# Patient Record
Sex: MALE | Race: WHITE | Employment: FULL TIME | URBAN - METROPOLITAN AREA
[De-identification: names, ages, dates, MRNs, and addresses within clinical notes are randomized per-mention and may not be internally consistent; named-entity substitution may affect disease eponyms.]

---

## 2020-10-09 ENCOUNTER — HOSPITAL ENCOUNTER (OUTPATIENT)
Dept: PHYSICAL THERAPY | Age: 35
Setting detail: THERAPIES SERIES
Discharge: HOME | End: 2020-10-09
Payer: COMMERCIAL

## 2020-10-09 PROCEDURE — 97110 THERAPEUTIC EXERCISES: CPT | Performed by: PHYSICAL THERAPIST

## 2020-10-09 PROCEDURE — 97161 PT EVAL LOW COMPLEX 20 MIN: CPT | Performed by: PHYSICAL THERAPIST

## 2020-10-09 PROCEDURE — G0283 ELEC STIM OTHER THAN WOUND: HCPCS | Performed by: PHYSICAL THERAPIST

## 2020-10-09 ASSESSMENT — PAIN SCALES - GENERAL: PAINLEVEL_OUTOF10: 5

## 2020-10-09 ASSESSMENT — PAIN DESCRIPTION - PROGRESSION: CLINICAL_PROGRESSION: NOT CHANGED

## 2020-10-09 ASSESSMENT — PAIN DESCRIPTION - FREQUENCY: FREQUENCY: CONTINUOUS

## 2020-10-09 ASSESSMENT — PAIN DESCRIPTION - LOCATION: LOCATION: BACK

## 2020-10-09 ASSESSMENT — PAIN - FUNCTIONAL ASSESSMENT: PAIN_FUNCTIONAL_ASSESSMENT: PREVENTS OR INTERFERES WITH MANY ACTIVE NOT PASSIVE ACTIVITIES

## 2020-10-09 ASSESSMENT — PAIN DESCRIPTION - DESCRIPTORS: DESCRIPTORS: SHOOTING;SHARP

## 2020-10-09 ASSESSMENT — PAIN DESCRIPTION - PAIN TYPE: TYPE: ACUTE PAIN

## 2020-10-09 ASSESSMENT — PAIN DESCRIPTION - ORIENTATION: ORIENTATION: RIGHT

## 2020-10-16 ENCOUNTER — HOSPITAL ENCOUNTER (OUTPATIENT)
Dept: PHYSICAL THERAPY | Age: 35
Setting detail: THERAPIES SERIES
Discharge: HOME | End: 2020-10-16
Payer: COMMERCIAL

## 2020-10-16 PROCEDURE — G0283 ELEC STIM OTHER THAN WOUND: HCPCS | Performed by: PHYSICAL THERAPIST

## 2020-10-16 PROCEDURE — 97110 THERAPEUTIC EXERCISES: CPT | Performed by: PHYSICAL THERAPIST

## 2020-10-16 ASSESSMENT — PAIN DESCRIPTION - FREQUENCY: FREQUENCY: CONTINUOUS

## 2020-10-16 ASSESSMENT — PAIN SCALES - GENERAL: PAINLEVEL_OUTOF10: 3

## 2020-10-16 ASSESSMENT — PAIN DESCRIPTION - PAIN TYPE: TYPE: ACUTE PAIN

## 2020-10-16 ASSESSMENT — PAIN - FUNCTIONAL ASSESSMENT: PAIN_FUNCTIONAL_ASSESSMENT: PREVENTS OR INTERFERES SOME ACTIVE ACTIVITIES AND ADLS

## 2020-10-16 ASSESSMENT — PAIN DESCRIPTION - LOCATION: LOCATION: BACK

## 2020-10-16 ASSESSMENT — PAIN DESCRIPTION - DESCRIPTORS: DESCRIPTORS: ACHING

## 2020-10-16 ASSESSMENT — PAIN DESCRIPTION - PROGRESSION: CLINICAL_PROGRESSION: GRADUALLY IMPROVING

## 2020-10-16 NOTE — PROGRESS NOTES
[address]   Treatment Note                                          Date: 10/16/2020  Patient: Precious Sorto  : 1985  ACCT #: [de-identified]  Referring Practitioner: Humera Read  Diagnosis: Sprain/strain low back    Visit Information:  PT Visit Information  Onset Date: 20  PT Insurance Information: [id number] [id number]  Plan of Care/Certification Expiration Date: 20  Progress Note Counter:     Subjective: Patient states he feeling better today, used a hot tub. HEP Compliance:  [x] Good [] Fair [] Poor [] Reports not doing due to:    Vital Signs  Patient Currently in Pain: Yes   Pain Screening  Patient Currently in Pain: Yes  Pain Assessment  Pain Assessment: 0-10  Pain Level: 3  Patient's Stated Pain Goal: No pain  Pain Type: Acute pain  Pain Location: Back  Pain Radiating Towards: Center and low back area  Pain Descriptors: Aching  Pain Frequency: Continuous  Clinical Progression: Gradually improving  Functional Pain Assessment: Prevents or interferes some active activities and ADLs    OBJECTIVE:   Exercises  Exercise 1: Single knee to chest  Exercise 2: supine knee rolls  Exercise 3: standing trunk sidebending, extension and rotation  Exercise 4: Supine bent knee fall out           Modalities:  Modalities  Moist heat: Moist heat in conjunction with IES  E-stim (parameters): Molly cross pattern across low back area     *Indicates exercise, modality, or manual techniques to be initiated when appropriate    Assessment:   Activity Tolerance  Activity Tolerance: Patient limited by pain  Activity Tolerance: Patient able to tolerate supine position today. Brought in a lumbar support with a sacral pad which fits him well. He reports that it seems to support his back well    Body structures, Functions, Activity limitations: Decreased ROM, Decreased strength, Increased pain  Assessment: Patient able to move his trunk better today, with less pain.   He still reports \"popping\" of his SI area when he flexes, and rotates his hip  Treatment Diagnosis: Sprain and strain of the lumbar area  Prognosis: Fair  PT Education: Home Exercise Program  Patient Education: May wear the lumbar support, but only for a few hours per day, and not when sleeping    Goals:  Short term goals  Time Frame for Short term goals: 3-5 treatments  Short term goal 1: Increase trunk range of motion by 10 degrees all affected motions  Short term goal 2: Pain decreased to 4/10 of the lower back  Short term goal 3: 50% reduction of tenderness of the right SI and sacral area       Progress toward goals:    POST-PAIN       Pain Rating (0-10 pain scale): 3  /10   Location and pain description same as pre-treatment unless indicated. Action: [] NA   [] Perform HEP  [] Meds as prescribed  [] Modalities as prescribed   [] Call Physician     Frequency/Duration:  Plan  Times per week: 2  Plan weeks: 6  Current Treatment Recommendations: Strengthening, ROM, Modalities, Pain Management     Pt to continue current HEP. See objective section for any therapeutic exercise changes, additions or modifications this date.     Treatment Initiated : IES and moist heat to low back and exercise    PT Individual Minutes  Time In: 1330  Time Out: 1410  Minutes: 40  Timed Code Treatment Minutes: 40 Minutes  Procedure Minutes:     code In/out Timed Activity Minutes Units   [id number] [id number] Ther Ex 25 2   [id number] [id number] IES 15 1       Signature:  Electronically signed by Rere Coates PT on 10/16/20 at 2:21 PM EDT

## 2020-10-21 ENCOUNTER — HOSPITAL ENCOUNTER (OUTPATIENT)
Dept: PHYSICAL THERAPY | Age: 35
Setting detail: THERAPIES SERIES
Discharge: HOME | End: 2020-10-21
Payer: COMMERCIAL

## 2020-10-21 PROCEDURE — 97110 THERAPEUTIC EXERCISES: CPT | Performed by: PHYSICAL THERAPIST

## 2020-10-21 PROCEDURE — G0283 ELEC STIM OTHER THAN WOUND: HCPCS | Performed by: PHYSICAL THERAPIST

## 2020-10-21 ASSESSMENT — PAIN DESCRIPTION - ORIENTATION: ORIENTATION: RIGHT

## 2020-10-21 ASSESSMENT — PAIN - FUNCTIONAL ASSESSMENT: PAIN_FUNCTIONAL_ASSESSMENT: PREVENTS OR INTERFERES SOME ACTIVE ACTIVITIES AND ADLS

## 2020-10-21 ASSESSMENT — PAIN DESCRIPTION - ONSET: ONSET: ON-GOING

## 2020-10-21 ASSESSMENT — PAIN SCALES - GENERAL: PAINLEVEL_OUTOF10: 4

## 2020-10-21 ASSESSMENT — PAIN DESCRIPTION - PAIN TYPE: TYPE: ACUTE PAIN

## 2020-10-21 ASSESSMENT — PAIN DESCRIPTION - LOCATION: LOCATION: BACK

## 2020-10-21 ASSESSMENT — PAIN DESCRIPTION - DESCRIPTORS: DESCRIPTORS: ACHING

## 2020-10-21 ASSESSMENT — PAIN DESCRIPTION - FREQUENCY: FREQUENCY: CONTINUOUS

## 2020-10-21 NOTE — PROGRESS NOTES
[id number] [facility]  Treatment Note                                          Date: 10/21/2020  Patient: Naveen Oviedo  : 1985  ACCT #: [de-identified]  Referring Practitioner: Andrew Hammer  Diagnosis: Sprain/strain low back    Visit Information:  PT Visit Information  Onset Date: 20  PT Insurance Information: [address] [place] [id number]  Total # of Visits Approved: 12  Plan of Care/Certification Expiration Date: 20  Progress Note Counter: 3/12    Subjective: Patient states he is better but still has low back pain     HEP Compliance:  [x] Good [] Fair [] Poor [] Reports not doing due to:    Vital Signs  Patient Currently in Pain: Yes   Pain Screening  Patient Currently in Pain: Yes  Pain Assessment  Pain Assessment: 0-10  Pain Level: 4  Patient's Stated Pain Goal: No pain  Pain Type: Acute pain  Pain Location: Back  Pain Orientation: Right  Pain Descriptors: Aching  Pain Frequency: Continuous  Pain Onset: On-going  Functional Pain Assessment: Prevents or interferes some active activities and ADLs    OBJECTIVE:   Exercises  Exercise 1: Single knee to chest  Exercise 2: supine knee rolls  Exercise 3: standing trunk sidebending, extension and rotation  Exercise 4: Supine bent knee fall out with yellow band  Exercise 5: Modified piriformis stretch 3 each  Exercise 6: DLS 2# bilateral arms 10 reps  Exercise 7: DLS march in place 1# 10 reps  Exercise 8: Nuwave bike low level 5 minutes            Modalities:  Modalities  Moist heat: Moist heat in conjunction with IES  E-stim (parameters): Molly cross pattern across low back area     *Indicates exercise, modality, or manual techniques to be initiated when appropriate    Assessment:   Activity Tolerance  Activity Tolerance: Patient Tolerated treatment well  Activity Tolerance: Patient able to tolerate the level 1 DLS exercises to increase core strength    Body structures, Functions, Activity limitations: Decreased ROM, Decreased strength, Increased pain  Assessment: Patient is moving better. He was able to tolerate the dynamic lumbar stabilization exercises fairly well. Complaining more of sacral pain today  Treatment Diagnosis: Sprain and strain of the lumbar area  Prognosis: Fair  PT Education: Home Exercise Program    Goals:  Short term goals  Time Frame for Short term goals: 3-5 treatments  Short term goal 1: Increase trunk range of motion by 10 degrees all affected motions  Short term goal 2: Pain decreased to 4/10 of the lower back  Short term goal 3: 50% reduction of tenderness of the right SI and sacral area    Long term goals  Time Frame for Long term goals : 6-12  Long term goal 1: Active range of moiton of the trunk within normal limits  Long term goal 2: No further tenderness of the SI area  Long term goal 3: Pain decreased to 1/10 to 0/10 of the low back  Long term goal 4: Able to return to regular work duty  Progress toward goals:    POST-PAIN       Pain Rating (0-10 pain scale):  3 /10   Location and pain description same as pre-treatment unless indicated. Action: [] NA   [x] Perform HEP  [] Meds as prescribed  [x] Modalities as prescribed   [] Call Physician     Frequency/Duration:  Plan  Times per week: 2  Plan weeks: 6  Current Treatment Recommendations: Strengthening, ROM, Modalities, Pain Management     Pt to continue current HEP. See objective section for any therapeutic exercise changes, additions or modifications this date.          PT Individual Minutes  Time In: 1340  Time Out: 4903  Minutes: 40  Timed Code Treatment Minutes: 40 Minutes  Procedure Minutes:     Timed Activity Minutes code In/out Units   Ther Ex 25 [id number] 2   IES  15 23477 [id number] 1       Signature:  Electronically signed by Daralyn Canavan, PT on 10/21/20 at 5:25 PM EDT

## 2020-10-23 ENCOUNTER — HOSPITAL ENCOUNTER (OUTPATIENT)
Dept: PHYSICAL THERAPY | Age: 35
Setting detail: THERAPIES SERIES
Discharge: HOME | End: 2020-10-23
Payer: COMMERCIAL

## 2020-10-23 PROCEDURE — 97110 THERAPEUTIC EXERCISES: CPT | Performed by: PHYSICAL THERAPIST

## 2020-10-23 PROCEDURE — G0283 ELEC STIM OTHER THAN WOUND: HCPCS | Performed by: PHYSICAL THERAPIST

## 2020-10-23 ASSESSMENT — PAIN DESCRIPTION - ONSET: ONSET: ON-GOING

## 2020-10-23 ASSESSMENT — PAIN - FUNCTIONAL ASSESSMENT: PAIN_FUNCTIONAL_ASSESSMENT: PREVENTS OR INTERFERES SOME ACTIVE ACTIVITIES AND ADLS

## 2020-10-23 ASSESSMENT — PAIN DESCRIPTION - ORIENTATION: ORIENTATION: RIGHT

## 2020-10-23 ASSESSMENT — PAIN DESCRIPTION - FREQUENCY: FREQUENCY: CONTINUOUS

## 2020-10-23 ASSESSMENT — PAIN DESCRIPTION - PROGRESSION: CLINICAL_PROGRESSION: GRADUALLY WORSENING

## 2020-10-23 ASSESSMENT — PAIN SCALES - GENERAL: PAINLEVEL_OUTOF10: 5

## 2020-10-23 ASSESSMENT — PAIN DESCRIPTION - DESCRIPTORS: DESCRIPTORS: ACHING

## 2020-10-23 ASSESSMENT — PAIN DESCRIPTION - PAIN TYPE: TYPE: ACUTE PAIN

## 2020-10-23 ASSESSMENT — PAIN DESCRIPTION - LOCATION: LOCATION: BACK

## 2020-10-23 NOTE — PROGRESS NOTES
[id number] [facility]   Treatment Note                                          Date: 10/23/2020  Patient: Colletta Landsberg  : 1985  ACCT #: [de-identified]  Referring Practitioner: Han Francis  Diagnosis: Sprain/strain low back    Visit Information:  PT Visit Information  Onset Date: 20  PT Insurance Information: [address] [place] [phone]  Total # of Visits Approved: 12  Plan of Care/Certification Expiration Date: 20  Progress Note Counter:     Subjective: Patient states yesterday his pain was 8/10 and he feels it was because of therapy treatment the day before. He took flexeril and was in bed alot yesterday.   Today he feels his back is better     HEP Compliance:  [x] Good [] Fair [] Poor [] Reports not doing due to:    Vital Signs  Patient Currently in Pain: Yes   Pain Screening  Patient Currently in Pain: Yes  Pain Assessment  Pain Assessment: 0-10  Pain Level: 5  Patient's Stated Pain Goal: No pain  Pain Type: Acute pain  Pain Location: Back  Pain Orientation: Right  Pain Descriptors: Aching  Pain Frequency: Continuous  Pain Onset: On-going  Clinical Progression: Gradually worsening  Functional Pain Assessment: Prevents or interferes some active activities and ADLs    OBJECTIVE:   Exercises  Exercise 1: Single knee to chest  Exercise 2: supine knee rolls  Exercise 3: standing trunk sidebending, extension and rotation  Exercise 4: Supine bent knee fall out with yellow band  Exercise 5: Modified piriformis stretch 3 each  Exercise 6: DLS  bilateral arms 10 reps  Exercise 7: DLS march in place  10 reps         Modalities:  Modalities  Moist heat: Moist heat in conjunction with IES  E-stim (parameters): Molly cross pattern across low back area     *Indicates exercise, modality, or manual techniques to be initiated when appropriate    Assessment:   Activity Tolerance  Activity Tolerance: Patient limited by pain  Activity Tolerance: Deferred the bike and the weights today due to his poor reaction to the last therapy treatment    Body structures, Functions, Activity limitations: Decreased ROM, Decreased strength, Increased pain  Assessment: Discussed with CNP about ordering an MRI after she sees him next week. He is difficult to progress due to his variable pain complaints  Treatment Diagnosis: Sprain and strain of the lumbar area  Prognosis: Fair  PT Education: Home Exercise Program    Goals:  Short term goals  Time Frame for Short term goals: 3-5 treatments  Short term goal 1: Increase trunk range of motion by 10 degrees all affected motions  Short term goal 2: Pain decreased to 4/10 of the lower back  Short term goal 3: 50% reduction of tenderness of the right SI and sacral area       Progress toward goals:    POST-PAIN       Pain Rating (0-10 pain scale):  5 /10   Location and pain description same as pre-treatment unless indicated. Action: [] NA   [] Perform HEP  [] Meds as prescribed  [] Modalities as prescribed   [] Call Physician     Frequency/Duration:        Pt to continue current HEP. See objective section for any therapeutic exercise changes, additions or modifications this date.          PT Individual Minutes  Time In: 6315  Time Out: 8573  Minutes: 45  Timed Code Treatment Minutes: 45 Minutes  Procedure Minutes:     Timed Activity Minutes code In/out Units   Ther Ex 30 [id number] 2   IES  15 26942 [id number] 1       Signature:  Electronically signed by Pedro Rosales PT on 10/23/20 at 4:20 PM EDT

## 2020-10-28 ENCOUNTER — HOSPITAL ENCOUNTER (OUTPATIENT)
Dept: PHYSICAL THERAPY | Age: 35
Setting detail: THERAPIES SERIES
Discharge: HOME | End: 2020-10-28
Payer: COMMERCIAL

## 2020-10-28 PROCEDURE — 97110 THERAPEUTIC EXERCISES: CPT | Performed by: PHYSICAL THERAPIST

## 2020-10-28 PROCEDURE — G0283 ELEC STIM OTHER THAN WOUND: HCPCS | Performed by: PHYSICAL THERAPIST

## 2020-10-28 ASSESSMENT — PAIN DESCRIPTION - LOCATION: LOCATION: BACK

## 2020-10-28 ASSESSMENT — PAIN DESCRIPTION - ONSET: ONSET: ON-GOING

## 2020-10-28 ASSESSMENT — PAIN DESCRIPTION - PAIN TYPE: TYPE: ACUTE PAIN

## 2020-10-28 ASSESSMENT — PAIN DESCRIPTION - ORIENTATION: ORIENTATION: RIGHT

## 2020-10-28 ASSESSMENT — PAIN DESCRIPTION - DESCRIPTORS: DESCRIPTORS: SHARP

## 2020-10-28 ASSESSMENT — PAIN DESCRIPTION - FREQUENCY: FREQUENCY: CONTINUOUS

## 2020-10-28 ASSESSMENT — PAIN DESCRIPTION - PROGRESSION: CLINICAL_PROGRESSION: GRADUALLY IMPROVING

## 2020-10-28 ASSESSMENT — PAIN - FUNCTIONAL ASSESSMENT: PAIN_FUNCTIONAL_ASSESSMENT: PREVENTS OR INTERFERES SOME ACTIVE ACTIVITIES AND ADLS

## 2020-10-28 ASSESSMENT — PAIN SCALES - GENERAL: PAINLEVEL_OUTOF10: 2

## 2020-10-28 NOTE — PROGRESS NOTES
[id number] [facility]   Treatment Note                                          Date: 10/28/2020  Patient: Amos Sebastian  : 1985  ACCT #: [de-identified]  Referring Practitioner: Richie Victoria       Visit Information:  PT Visit Information  Onset Date: 20  PT Insurance Information: [id number]  Total # of Visits Approved: 12  Plan of Care/Certification Expiration Date: 20  Progress Note Counter:     Subjective: Pateint reports that his back pain is a 4/10 when bending over. and when walking 2/10.   Mostly the pain is more in his \"tailbone\" area     HEP Compliance:  [x] Good [] Fair [] Poor [] Reports not doing due to:    Vital Signs  Patient Currently in Pain: Yes   Pain Screening  Patient Currently in Pain: Yes  Pain Assessment  Pain Assessment: 0-10  Pain Level: 2  Patient's Stated Pain Goal: No pain  Pain Type: Acute pain  Pain Location: Back  Pain Orientation: Right  Pain Radiating Towards: down the right buttock  Pain Descriptors: Sharp  Pain Frequency: Continuous  Pain Onset: On-going  Clinical Progression: Gradually improving  Functional Pain Assessment: Prevents or interferes some active activities and ADLs    OBJECTIVE:   Exercises  Exercise 1: Single knee to chest  Exercise 2: supine knee rolls  Exercise 3: standing trunk sidebending, extension and rotation  Exercise 4: Supine bent knee fall out with yellow band  Exercise 5: Modified piriformis stretch 3 each  Exercise 6: DLS  bilateral arms 10 reps 2#  Exercise 7: DLS march in place  10 reps 1#       ROM: [] NT  [] ROM measurements:         Spine  Lumbar: forward flexion 0-30 degrees, extension 0-10 degrees, bilateral sidebend 0-20 degrees, bilateral rotation 0-20 degrees    Modalities:  Modalities  Moist heat: Moist heat in conjunction with IES  E-stim (parameters): Molly cross pattern across low back area     *Indicates exercise, modality, or manual techniques to be initiated when appropriate    Assessment:   Activity Tolerance  Activity Tolerance: Patient limited by pain  Activity Tolerance: Sidebending especially to the left and forward flexion is very painful       Assessment: MRI has been submitted for authorization. Pain of his back is better today. Still requires therapy to increase his trunk range of motion and his strength so he can return to work  Treatment Diagnosis: Sprain and strain of the lumbar area  Prognosis: Fair  PT Education: Home Exercise Program    Goals:  Short term goals  Time Frame for Short term goals: 3-5 treatments  Short term goal 1: Increase trunk range of motion by 10 degrees all affected motions  Short term goal 2: Pain decreased to 4/10 of the lower back  Short term goal 3: 50% reduction of tenderness of the right SI and sacral area    Long term goals  Time Frame for Long term goals : 6-12  Long term goal 1: Active range of moiton of the trunk within normal limits  Long term goal 2: No further tenderness of the SI area  Long term goal 3: Pain decreased to 1/10 to 0/10 of the low back  Long term goal 4: Able to return to regular work duty  Progress toward goals:    POST-PAIN       Pain Rating (0-10 pain scale): 2 /10   Location and pain description same as pre-treatment unless indicated. Action: [] NA   [x] Perform HEP  [x] Meds as prescribed  [x] Modalities as prescribed   [] Call Physician     Frequency/Duration:  Plan  Times per week: 2  Current Treatment Recommendations: Strengthening, ROM, Modalities, Pain Management     Pt to continue current HEP. See objective section for any therapeutic exercise changes, additions or modifications this date.          PT Individual Minutes  Time In: 1330  Time Out: 3887  Minutes: 45  Timed Code Treatment Minutes: 45 Minutes  Procedure Minutes:     Timed Activity Minutes code In/out Units   Ther Ex 30 [id number] 2   IES 15 [id number] 1       Signature:  Electronically signed by Abdiaziz Stein PT on 10/28/20 at 2:19 PM EDT

## 2020-10-30 ENCOUNTER — HOSPITAL ENCOUNTER (OUTPATIENT)
Dept: PHYSICAL THERAPY | Age: 35
Setting detail: THERAPIES SERIES
Discharge: HOME | End: 2020-10-30
Payer: COMMERCIAL

## 2020-10-30 PROCEDURE — G0283 ELEC STIM OTHER THAN WOUND: HCPCS | Performed by: PHYSICAL THERAPIST

## 2020-10-30 PROCEDURE — 97110 THERAPEUTIC EXERCISES: CPT | Performed by: PHYSICAL THERAPIST

## 2020-10-30 ASSESSMENT — PAIN DESCRIPTION - ORIENTATION: ORIENTATION: RIGHT;LEFT

## 2020-10-30 ASSESSMENT — PAIN SCALES - GENERAL: PAINLEVEL_OUTOF10: 5

## 2020-10-30 ASSESSMENT — PAIN DESCRIPTION - PROGRESSION: CLINICAL_PROGRESSION: GRADUALLY WORSENING

## 2020-10-30 ASSESSMENT — PAIN DESCRIPTION - PAIN TYPE: TYPE: ACUTE PAIN

## 2020-10-30 ASSESSMENT — PAIN - FUNCTIONAL ASSESSMENT: PAIN_FUNCTIONAL_ASSESSMENT: PREVENTS OR INTERFERES SOME ACTIVE ACTIVITIES AND ADLS

## 2020-10-30 ASSESSMENT — PAIN DESCRIPTION - LOCATION: LOCATION: BACK

## 2020-10-30 ASSESSMENT — PAIN DESCRIPTION - DESCRIPTORS: DESCRIPTORS: SHARP

## 2020-10-30 ASSESSMENT — PAIN DESCRIPTION - FREQUENCY: FREQUENCY: CONTINUOUS

## 2020-10-30 ASSESSMENT — PAIN DESCRIPTION - ONSET: ONSET: ON-GOING

## 2020-10-30 NOTE — PROGRESS NOTES
[id number] [facility]  Treatment Note                                          Date: 10/30/2020  Patient: Prerna Armando  : 1985  ACCT #: [de-identified]  Referring Practitioner: Jayleen Wilkes  Diagnosis: Sprain/strain low back    Visit Information:  PT Visit Information  Onset Date: 20  PT Insurance Information: [address]  [id number]  Total # of Visits Approved: 12  Plan of Care/Certification Expiration Date: 20  Progress Note Counter:     Subjective: Patient reports he had a bad day yesterday, today his back is about a 5/10.   His MRI is scheduled for 2020     HEP Compliance:  [] Good [x] Fair [] Poor [] Reports not doing due to:    Vital Signs  Patient Currently in Pain: Yes   Pain Screening  Patient Currently in Pain: Yes  Pain Assessment  Pain Assessment: 0-10  Pain Level: 5  Patient's Stated Pain Goal: No pain  Pain Type: Acute pain  Pain Location: Back  Pain Orientation: Right;Left  Pain Descriptors: Sharp  Pain Frequency: Continuous  Pain Onset: On-going  Clinical Progression: Gradually worsening  Functional Pain Assessment: Prevents or interferes some active activities and ADLs    OBJECTIVE:   Exercises  Exercise 1: Single knee to chest  Exercise 2: supine knee rolls  Exercise 3: standing trunk sidebending, extension and rotation  Exercise 4: Supine bent knee fall out with red band  Exercise 5: Modified piriformis stretch 3 each  Exercise 6: DLS  bilateral arms 10 reps 2#  Exercise 7: DLS march in place  10 reps 1#        Modalities:  Modalities  Moist heat: Moist heat in conjunction with IES  E-stim (parameters): Molly cross pattern across low back area     *Indicates exercise, modality, or manual techniques to be initiated when appropriate    Assessment:   Activity Tolerance  Activity Tolerance: Patient does not tolerate the stretches well, due to his back pain    Body structures, Functions, Activity limitations: Decreased ROM, Decreased strength,

## 2020-11-04 ENCOUNTER — HOSPITAL ENCOUNTER (OUTPATIENT)
Dept: PHYSICAL THERAPY | Age: 35
Setting detail: THERAPIES SERIES
Discharge: HOME | End: 2020-11-04
Payer: COMMERCIAL

## 2020-11-04 PROCEDURE — 97110 THERAPEUTIC EXERCISES: CPT | Performed by: PHYSICAL THERAPIST

## 2020-11-04 ASSESSMENT — PAIN SCALES - GENERAL: PAINLEVEL_OUTOF10: 4

## 2020-11-04 ASSESSMENT — PAIN DESCRIPTION - PAIN TYPE: TYPE: ACUTE PAIN

## 2020-11-04 ASSESSMENT — PAIN DESCRIPTION - FREQUENCY: FREQUENCY: CONTINUOUS

## 2020-11-04 ASSESSMENT — PAIN - FUNCTIONAL ASSESSMENT: PAIN_FUNCTIONAL_ASSESSMENT: PREVENTS OR INTERFERES SOME ACTIVE ACTIVITIES AND ADLS

## 2020-11-04 ASSESSMENT — PAIN DESCRIPTION - LOCATION: LOCATION: BACK

## 2020-11-04 NOTE — PROGRESS NOTES
with less pain today       Assessment: Patient's trunk range of motion is improving. He still complains of right lower extremity pain. Treatment Diagnosis: Sprain and strain of the lumbar area  Prognosis: Fair  PT Education: Home Exercise Program    Goals:  Short term goals  Time Frame for Short term goals: 3-5 treatments  Short term goal 1: Increase trunk range of motion by 10 degrees all affected motions goal met  Short term goal 2: Pain decreased to 4/10 of the lower back goal met    Long term goals  Time Frame for Long term goals : 6-12  Long term goal 1: Active range of moiton of the trunk within normal limits  Long term goal 2: No further tenderness of the SI area  Long term goal 3: Pain decreased to 1/10 to 0/10 of the low back  Long term goal 4: Able to return to regular work duty  Progress toward goals:    POST-PAIN       Pain Rating (0-10 pain scale):  4 /10   Location and pain description same as pre-treatment unless indicated. Action: [] NA   [x] Perform HEP  [] Meds as prescribed  [x] Modalities as prescribed   [] Call Physician     Frequency/Duration:  Plan  Plan weeks: 6  Current Treatment Recommendations: Strengthening, ROM, Modalities, Pain Management     Pt to continue current HEP. See objective section for any therapeutic exercise changes, additions or modifications this date.          PT Individual Minutes  Time In: 1330  Time Out: 1400  Minutes: 30  Timed Code Treatment Minutes: 30 Minutes  Procedure Minutes:     Timed Activity Minutes code In/out Units   Ther Ex 30 [id number] 2   Manual            Signature:  Electronically signed by Osmany Winter PT on 11/4/20 at 1:59 PM EST

## 2020-11-11 ENCOUNTER — HOSPITAL ENCOUNTER (OUTPATIENT)
Dept: PHYSICAL THERAPY | Age: 35
Setting detail: THERAPIES SERIES
Discharge: HOME | End: 2020-11-11
Payer: COMMERCIAL

## 2020-11-11 PROCEDURE — 97110 THERAPEUTIC EXERCISES: CPT | Performed by: PHYSICAL THERAPIST

## 2020-11-11 PROCEDURE — G0283 ELEC STIM OTHER THAN WOUND: HCPCS | Performed by: PHYSICAL THERAPIST

## 2020-11-11 ASSESSMENT — PAIN - FUNCTIONAL ASSESSMENT: PAIN_FUNCTIONAL_ASSESSMENT: PREVENTS OR INTERFERES SOME ACTIVE ACTIVITIES AND ADLS

## 2020-11-11 ASSESSMENT — PAIN DESCRIPTION - PROGRESSION: CLINICAL_PROGRESSION: GRADUALLY WORSENING

## 2020-11-11 ASSESSMENT — PAIN DESCRIPTION - FREQUENCY: FREQUENCY: CONTINUOUS

## 2020-11-11 ASSESSMENT — PAIN DESCRIPTION - DESCRIPTORS: DESCRIPTORS: SHARP

## 2020-11-11 ASSESSMENT — PAIN DESCRIPTION - ONSET: ONSET: ON-GOING

## 2020-11-11 ASSESSMENT — PAIN DESCRIPTION - LOCATION: LOCATION: BACK

## 2020-11-11 ASSESSMENT — PAIN DESCRIPTION - ORIENTATION: ORIENTATION: RIGHT;LEFT

## 2020-11-11 ASSESSMENT — PAIN SCALES - GENERAL: PAINLEVEL_OUTOF10: 5

## 2020-11-11 ASSESSMENT — PAIN DESCRIPTION - PAIN TYPE: TYPE: ACUTE PAIN

## 2020-11-11 NOTE — PROGRESS NOTES
activity in standing or supine. Body structures, Functions, Activity limitations: Decreased ROM, Decreased strength, Increased pain  Assessment: It is difficult to advance his program due to his complaint of pain of his low back and right leg. He has a MRI scheduled on the 11/18/2020 and this should shed some light on his injury and assist in his next course of treatment. Treatment Diagnosis: Sprain and strain of the lumbar area  Prognosis: Fair  PT Education: Home Exercise Program    Goals:  Short term goals  Time Frame for Short term goals: 3-5 treatments    Long term goals  Time Frame for Long term goals : 6-12  Long term goal 1: Active range of moiton of the trunk within normal limits  Long term goal 2: No further tenderness of the SI area  Long term goal 3: Pain decreased to 1/10 to 0/10 of the low back  Long term goal 4: Able to return to regular work duty  Progress toward goals:    POST-PAIN       Pain Rating (0-10 pain scale):  5 /10   Location and pain description same as pre-treatment unless indicated. Action: [] NA   [x] Perform HEP  [] Meds as prescribed  [x] Modalities as prescribed   [] Call Physician     Frequency/Duration:  Plan  Times per week: 1  Plan Comment: Plan date of service expires after 11/13/2020, will put on hold after this date to wait for the results of the MRI     Pt to continue current HEP. See objective section for any therapeutic exercise changes, additions or modifications this date.          PT Individual Minutes  Time In: 1330  Time Out: 1410  Minutes: 40  Timed Code Treatment Minutes: 40 Minutes  Procedure Minutes:     Timed Activity Minutes code In/out Units   Ther Ex 25 [id number] 2   IES  15 [id number] 1       Signature:  Electronically signed by Anuel Dawkins PT on 11/11/20 at 2:17 PM EST

## 2020-11-13 ENCOUNTER — HOSPITAL ENCOUNTER (OUTPATIENT)
Dept: PHYSICAL THERAPY | Age: 35
Setting detail: THERAPIES SERIES
Discharge: HOME | End: 2020-11-13
Payer: COMMERCIAL

## 2020-11-13 PROCEDURE — G0283 ELEC STIM OTHER THAN WOUND: HCPCS | Performed by: PHYSICAL THERAPIST

## 2020-11-13 ASSESSMENT — PAIN DESCRIPTION - PAIN TYPE: TYPE: ACUTE PAIN

## 2020-11-13 ASSESSMENT — PAIN DESCRIPTION - ONSET: ONSET: ON-GOING

## 2020-11-13 ASSESSMENT — PAIN DESCRIPTION - LOCATION: LOCATION: BACK

## 2020-11-13 ASSESSMENT — PAIN DESCRIPTION - FREQUENCY: FREQUENCY: CONTINUOUS

## 2020-11-13 ASSESSMENT — PAIN SCALES - GENERAL: PAINLEVEL_OUTOF10: 6

## 2020-11-13 ASSESSMENT — PAIN DESCRIPTION - ORIENTATION: ORIENTATION: RIGHT

## 2020-11-13 ASSESSMENT — PAIN DESCRIPTION - PROGRESSION: CLINICAL_PROGRESSION: GRADUALLY WORSENING

## 2020-11-13 ASSESSMENT — PAIN - FUNCTIONAL ASSESSMENT: PAIN_FUNCTIONAL_ASSESSMENT: PREVENTS OR INTERFERES SOME ACTIVE ACTIVITIES AND ADLS

## 2020-11-13 NOTE — PROGRESS NOTES
[id number] [facility]   Treatment Note                                          Date: 2020  Patient: Julian Parker  : 1985  ACCT #: [de-identified]  Referring Practitioner: Ray Hoffman  Diagnosis: Sprain/strain low back    Visit Information:  PT Visit Information  Onset Date: 20  PT Insurance Information: [id number]  Total # of Visits Approved: 12  Plan of Care/Certification Expiration Date: 20  Progress Note Counter:     Subjective: Patient states that his back and leg are worse, and he believes it was from the [facility] bike he did for 5 minutes from the last therapy. He states he has pain with even moving his right leg forward. HEP Compliance:  [] Good [x] Fair [] Poor [] Reports not doing due to:    Vital Signs  Patient Currently in Pain: Yes   Pain Screening  Patient Currently in Pain: Yes  Pain Assessment  Pain Assessment: 0-10  Pain Level: 6  Patient's Stated Pain Goal: No pain  Pain Type: Acute pain  Pain Location: Back  Pain Orientation: Right  Pain Frequency: Continuous  Pain Onset: On-going  Clinical Progression: Gradually worsening  Functional Pain Assessment: Prevents or interferes some active activities and ADLs    OBJECTIVE:            Modalities:  Modalities  Moist heat: Moist heat in conjunction with IES  E-stim (parameters): Molly cross pattern across low back area     *Indicates exercise, modality, or manual techniques to be initiated when appropriate    Assessment:   Activity Tolerance  Activity Tolerance: Patient limited by pain  Activity Tolerance: Patient could not tolerate exercises today. Body structures, Functions, Activity limitations: Decreased ROM, Decreased strength, Increased pain  Assessment: Patient is not making substantial progress. Discussed this with CNP in [address] [place] [address] and it was decided to place the patient on hold from any further therapy until the results of his MRI are obtained.   Discussed this with patient and he is in agreement with this plan. Treatment Diagnosis: Sprain and strain of the lumbar area  Prognosis: Fair  Patient Education: continue with stretching exercises at home    Goals:          Progress toward goals: Poor    POST-PAIN       Pain Rating (0-10 pain scale): 6  /10   Location and pain description same as pre-treatment unless indicated. Action: [] NA   [x] Perform HEP  [x] Meds as prescribed  [x] Modalities as prescribed   [] Call Physician     Frequency/Duration:  Plan  Plan Comment: Therapy on hold until MRI results are obtained     Pt to continue current HEP. See objective section for any therapeutic exercise changes, additions or modifications this date.          PT Individual Minutes  Time In: 6666  Time Out: 1350  Minutes: 15  Timed Code Treatment Minutes: 15 Minutes  Procedure Minutes:     Timed Activity Minutes code In/out Units   Ther Ex       IES 15 [id number] 1       Signature:  Electronically signed by Anuel Dawkins PT on 11/13/20 at 2:10 PM EST

## 2020-11-18 ENCOUNTER — HOSPITAL ENCOUNTER (OUTPATIENT)
Dept: MRI IMAGING | Age: 35
Discharge: HOME | End: 2020-11-20
Payer: COMMERCIAL

## 2020-11-18 PROCEDURE — 72148 MRI LUMBAR SPINE W/O DYE: CPT
